# Patient Record
(demographics unavailable — no encounter records)

---

## 2025-05-22 NOTE — HISTORY OF PRESENT ILLNESS
[FreeTextEntry1] : Initial hx 3/2021 Was seeing Dr. Velarde. 31yo - had 3 c sections. soon after, R side became numb, then improved. was dx'd with MS. got oral steroids. In 30s, had L eye visual blurriness, likely ON. ~39yo - had a "bad attack" - lost ability to walk, speech was affected. improved dramatically but with resultant pertinent gait imbalance. got oral steroids.  01/2021 - woke up with slurred speech, trouble walking, and incoordination of the RUE. Got 2 doses of IVMP. Did not get a 3rd because "did not take her insurance". has gradually improved but still not back to baseline (at least 70% back to normal) 03/2021 - had to stand for a prolonged period of time, then had a fall due to lightheadedness and leg weakness, no LOC. Went to ED, got CTH in ED and pt was told "everything was fine". Had a trial of gilenya in ~2010 but only for 7m, and it was "terrible".  Had COVID 2nd Booster in 11/2022. Had an episode of visual symptom next day, which lasted for 30 minutes, and she felt really sick for few days.  Lost to follow up from 3/2021 to 4/2023, and then again from 4/2023 to 5/2025.   Subj interval:  Describes shooting electric like sensation affecting the hand only when using the hand  Daughter diagnosed with Breast CA causing significant stress  Her balance is getting little worse over past 2 years. She has still been able to walk without assistance.   Some floaters in both eyes.    PMHX: MS meningioma, sp radiotherapy  MEDS: HCTZ 25 mg Candesartan 32 mg   ALL: morphine  SHx: no tob, no etoh, no drugs. used to be a  in the Health system.  FHx: no MS   O:   AO3. Normally conversant. Follows commands, names, and repeats. Good attention.  PERRL, VFF, EOMI, no nystagmus, face symmetric, TUP at midline.  Motor:     R:  L: Del   5  5 Bi   5  5 Tri   5  5 Wrist Extensors  5  5 Finger abductors  5  5    5  5   HF   5  5 KE   5  5 KF   5  5 DF   5  5 PF   5  5  Tone   R  L UE   0  0  LE   0  0  Sensory  RUE  LUE  RLE LLE  LT   +  +  +  + Vib   +  +  -  - PP   +  +  +  +  Reflexes:    R  L   Biceps   1  1 BR   1  1 Triceps  1  1 Pat   0  0 AJ   0   0  Coordination:    R  L   FTN   0  1 EMMETT   0  +/- HTS   0  0   Other        Gait: spastic appearance, wide based, can step on heels and toes but unable to tandem    Assistance: none  [email or web address] 4/2023 - 7.0, 7.0 5/2025: 9.57, 9.32   MRI brain 4/2021 ([facility]) - reviewed, consistent with MS with typical PV lesions. small meningioma without substantial mass effect.  Pt reports having had an EEG in the hospital and she reports it was "fine".  MRI brain and C spine 7/2023 - brain unchanged on my review c/w 2021 ([facility]). C spine with 2 cord lesions, but no priors; do not have an acute appearance. stable appearing meningioma, despite absence of contrast, without any increased edema.   AP: 77yo w/ hx of MS, several relapses initially, no definite progression though possible slow worsening of gait, walks without assistive devices.  Some VBS loss in LEs and decreased DTRs, ? neuropathy.  all questions answered, education provided re: dx, sx. management discussed at length.  - check new brain, C spine, and T spine MRI given recent changes on exam. - cont off DMT for now - Neuropathy labs - referral to ophtho for floaters, ? cataract - RTC 1y presuming MRIs are unchanged

## 2025-05-22 NOTE — END OF VISIT
[] : Fellow [FreeTextEntry3] : no new MS sx. exam with dec DTRs and some VBS loss, ? neuropathy. check mris to r/o new lesions.  [Time Spent: ___ minutes] : I have spent [unfilled] minutes of time on the encounter which excludes teaching and separately reported services.

## 2025-05-22 NOTE — HISTORY OF PRESENT ILLNESS
[FreeTextEntry1] : Initial hx 3/2021 Was seeing Dr. Velarde. 31yo - had 3 c sections. soon after, R side became numb, then improved. was dx'd with MS. got oral steroids. In 30s, had L eye visual blurriness, likely ON. ~41yo - had a "bad attack" - lost ability to walk, speech was affected. improved dramatically but with resultant pertinent gait imbalance. got oral steroids.  01/2021 - woke up with slurred speech, trouble walking, and incoordination of the RUE. Got 2 doses of IVMP. Did not get a 3rd because "did not take her insurance". has gradually improved but still not back to baseline (at least 70% back to normal) 03/2021 - had to stand for a prolonged period of time, then had a fall due to lightheadedness and leg weakness, no LOC. Went to ED, got CTH in ED and pt was told "everything was fine". Had a trial of gilenya in ~2010 but only for 7m, and it was "terrible".  Had COVID 2nd Booster in 11/2022. Had an episode of visual symptom next day, which lasted for 30 minutes, and she felt really sick for few days.  Lost to follow up from 3/2021 to 4/2023, and then again from 4/2023 to 5/2025.   Subj interval:  Describes shooting electric like sensation affecting the hand only when using the hand  Daughter diagnosed with Breast CA causing significant stress  Her balance is getting little worse over past 2 years. She has still been able to walk without assistance.   Some floaters in both eyes.    PMHX: MS meningioma, sp radiotherapy  MEDS: HCTZ 25 mg Candesartan 32 mg   ALL: morphine  SHx: no tob, no etoh, no drugs. used to be a  in the [facility].  FHx: no MS   O:   AO3. Normally conversant. Follows commands, names, and repeats. Good attention.  PERRL, VFF, EOMI, no nystagmus, face symmetric, TUP at midline.  Motor:     R:  L: Del   5  5 Bi   5  5 Tri   5  5 Wrist Extensors  5  5 Finger abductors  5  5    5  5   HF   5  5 KE   5  5 KF   5  5 DF   5  5 PF   5  5  Tone   R  L UE   0  0  LE   0  0  Sensory  RUE  LUE  RLE LLE  LT   +  +  +  + Vib   +  +  -  - PP   +  +  +  +  Reflexes:    R  L   Biceps   1  1 BR   1  1 Triceps  1  1 Pat   0  0 AJ   0   0  Coordination:    R  L   FTN   0  1 EMMETT   0  +/- HTS   0  0   Other        Gait: spastic appearance, wide based, can step on heels and toes but unable to tandem    Assistance: none  t25fw 4/2023 - 7.0, 7.0 5/2025: 9.57, 9.32   MRI brain 4/2021 ([facility]) - reviewed, consistent with MS with typical PV lesions. small meningioma without substantial mass effect.  Pt reports having had an EEG in the hospital and she reports it was "fine".  MRI brain and C spine 7/2023 - brain unchanged on my review c/w 2021 ([facility]). C spine with 2 cord lesions, but no priors; do not have an acute appearance. stable appearing meningioma, despite absence of contrast, without any increased edema.   AP: 75yo w/ hx of MS, several relapses initially, no definite progression though possible slow worsening of gait, walks without assistive devices.  Some VBS loss in LEs and decreased DTRs, ? neuropathy.  all questions answered, education provided re: dx, sx. management discussed at length.  - check new brain, C spine, and T spine MRI given recent changes on exam. - cont off DMT for now - Neuropathy labs - referral to ophtho for floaters, ? cataract - RTC 1y presuming MRIs are unchanged